# Patient Record
Sex: MALE | Race: BLACK OR AFRICAN AMERICAN | URBAN - METROPOLITAN AREA
[De-identification: names, ages, dates, MRNs, and addresses within clinical notes are randomized per-mention and may not be internally consistent; named-entity substitution may affect disease eponyms.]

---

## 2021-12-16 ENCOUNTER — TELEPHONE (OUTPATIENT)
Dept: PULMONOLOGY | Age: 46
End: 2021-12-16

## 2021-12-16 NOTE — TELEPHONE ENCOUNTER
Patient left a voicemail to schedule appointment. Last seen 2015. Called patient back and left message to schedule appointment.   [phone]

## 2022-02-17 ENCOUNTER — TELEMEDICINE (OUTPATIENT)
Dept: PULMONOLOGY | Age: 47
End: 2022-02-17
Payer: COMMERCIAL

## 2022-02-17 ENCOUNTER — TELEPHONE (OUTPATIENT)
Dept: PULMONOLOGY | Age: 47
End: 2022-02-17

## 2022-02-17 DIAGNOSIS — G47.33 OBSTRUCTIVE SLEEP APNEA: Primary | ICD-10-CM

## 2022-02-17 DIAGNOSIS — E66.9 NON MORBID OBESITY, UNSPECIFIED OBESITY TYPE: Chronic | ICD-10-CM

## 2022-02-17 PROCEDURE — 99203 OFFICE O/P NEW LOW 30 MIN: CPT | Performed by: INTERNAL MEDICINE

## 2022-02-17 ASSESSMENT — SLEEP AND FATIGUE QUESTIONNAIRES
HOW LIKELY ARE YOU TO NOD OFF OR FALL ASLEEP WHEN YOU ARE A PASSENGER IN A CAR FOR AN HOUR WITHOUT A BREAK: 0
HOW LIKELY ARE YOU TO NOD OFF OR FALL ASLEEP WHILE SITTING INACTIVE IN A PUBLIC PLACE: 0
HOW LIKELY ARE YOU TO NOD OFF OR FALL ASLEEP WHILE LYING DOWN TO REST IN THE AFTERNOON WHEN CIRCUMSTANCES PERMIT: 1
HOW LIKELY ARE YOU TO NOD OFF OR FALL ASLEEP IN A CAR, WHILE STOPPED FOR A FEW MINUTES IN TRAFFIC: 0
HOW LIKELY ARE YOU TO NOD OFF OR FALL ASLEEP WHILE SITTING QUIETLY AFTER LUNCH WITHOUT ALCOHOL: 1
HOW LIKELY ARE YOU TO NOD OFF OR FALL ASLEEP WHILE WATCHING TV: 0
HOW LIKELY ARE YOU TO NOD OFF OR FALL ASLEEP WHILE SITTING AND READING: 0
HOW LIKELY ARE YOU TO NOD OFF OR FALL ASLEEP WHILE SITTING AND TALKING TO SOMEONE: 0
ESS TOTAL SCORE: 2

## 2022-02-17 NOTE — PROGRESS NOTES
Nehemias Burden MD, [facility], [facility]  Javid Segura CNP [address]  [address] [facility], [address] [phone]   [facility] [address] Roni Anne. [address] [facility] [address] [phone]     Video Visit- [address] [address], was evaluated through a synchronous (real-time) audio-video  encounter. The patient (or guardian if applicable) is aware that this is a billable  service, which includes applicable co-pays. This Virtual Visit was conducted with  patient's (and/or legal guardian's) consent. The visit was conducted pursuant to  the emergency declaration under the [facility], [address] [place] [address] [facility] authority and the [employer] and  [employer] General Act. Patient identification was verified,  and a caregiver was present when appropriate. The patient was located in a  state where the provider was licensed to provide care. Assessment:      Visit Diagnoses and Associated Orders     Obstructive sleep apnea   (New Problem)  -  Primary    old records reviewed         Non morbid obesity, unspecified obesity type   (Not Stable)                  Plan:      Reviewed compliance download with pt. Supplies and parts as needed for his machine. These are medically necessary. Continue medications per his PCP and other physicians. Limit caffeine use after 3pm.  Encouraged him to work on weight loss through diet and exercise. The primary encounter diagnosis was Obstructive sleep apnea. A diagnosis of Non morbid obesity, unspecified obesity type was also pertinent to this visit. The chronic medical conditions listed are directly related to the primary diagnosis listed above. The management of the primary diagnosis affects the secondary diagnosis and vice versa. Machine is over 5years old and is medically necessary for it to be replaced.     Discussed with patient today the recent recall issued by [employer] for their Casual[employer] platform Pap machines. Reviewed that it affected a very small number of machines (0.03%) and seems to be exacerbated by using ozone disinfection or machine being exposed to a very high heat/humidity environment. Recommended the patient immediately discontinue use of any external ozone  if being used. Discussed the risk of untreated sleep apnea (including but not limited to early morbidity mortality, motor vehicle accidents, and cardiovascular issues, etc.) versus the very small risk of foam degradation with use of the machine. Possible alternative may be to switch manufacturers for their machine but will need to see if their insurance company will allow that. Reviewed external PSG done on 10/11/2013 that showed severe sleep apnea with an AHI of 31/HI and a low sat of 91%. Reviewed external titration done on 11/8/2013 showing the patient was titrated pressure 7. This information was analyzed to assess complexity and medical decision making in regards to further testing and management. Pt would medically benefit from wt loss for KATHY (diet, exercise, surgical). Subjective:     Patient ID: Chester Carvalho is a 50 y.o. male. Chief Complaint   Patient presents with    Sleep Apnea       HPI:      Chester Carvalho is a 50 y.o. male self-referred for a sleep evaluation. He complains of: Diagnosis severe sleep apnea back in 2013. Was placed on a machine and did well for some time. He was lost to follow-up back in 2015. Since then he has gained some weight and is not sleeping as well as machine is making loud noises at nighttime. His machine is currently 5years old. DOT/CDL - No  FAA/'s license -No    Previous Report(s) Reviewed: historical medical records, office notes, and referral letter(s). Pertinent data has been documented.     [place] - Total score: 2    Social History     Socioeconomic History    Marital status:      Spouse name: Not on file    Number of children: Not on file    Years of education: Not on file    Highest education level: Not on file   Occupational History    Not on file   Tobacco Use    Smoking status: Never Smoker    Smokeless tobacco: Never Used   Substance and Sexual Activity    Alcohol use: No     Alcohol/week: 0.0 standard drinks    Drug use: No    Sexual activity: Not on file   Other Topics Concern    Not on file   Social History Narrative    Not on file     Social Determinants of Health     Financial Resource Strain:     Difficulty of Paying Living Expenses: Not on file   Food Insecurity:     Worried About Running Out of Food in the Last Year: Not on file    Shala of Food in the Last Year: Not on file   Transportation Needs:     Lack of Transportation (Medical): Not on file    Lack of Transportation (Non-Medical): Not on file   Physical Activity:     Days of Exercise per Week: Not on file    Minutes of Exercise per Session: Not on file   Stress:     Feeling of Stress : Not on file   Social Connections:     Frequency of Communication with Friends and Family: Not on file    Frequency of Social Gatherings with Friends and Family: Not on file    Attends Anabaptism Services: Not on file    Active Member of [address] [place] [address] or Organizations: Not on file    Attends Club or Organization Meetings: Not on file    Marital Status: Not on file   Intimate Partner Violence:     Fear of Current or Ex-Partner: Not on file    Emotionally Abused: Not on file    Physically Abused: Not on file    Sexually Abused: Not on file   Housing Stability:     Unable to Pay for Housing in the Last Year: Not on file    Number of [place] in the Last Year: Not on file    Unstable Housing in the Last Year: Not on file        Current Outpatient Medications   Medication Sig Dispense Refill    rosuvastatin (CRESTOR) 10 MG TABS Take by mouth      Multiple Vitamin (MULTI VITAMIN DAILY PO) Take by mouth       No current facility-administered medications for this visit. Allergies as of 02/17/2022    (No Known Allergies)       Patient Active Problem List   Diagnosis    Obstructive sleep apnea    Hypercholesteremia    Non morbid obesity, unspecified obesity type       Past Medical History:   Diagnosis Date    Hypercholesteremia     Non morbid obesity, unspecified obesity type 2/17/2022    Obstructive sleep apnea     Obstructive sleep apnea (adult) (pediatric)        Family History   Problem Relation Age of Onset    Hypertension Father        Objective:     Vitals:  Patient reported Height and Weight Calculated BMI   Patient-Reported Vitals 2/17/2022   Patient-Reported Weight 205 lb   Patient-Reported Height 5.6'       33.1     Due to COVID-19 this was a virtual visit and physical exam was deferred.     Electronically signed by Abbe Partida MD on2/17/2022 at 10:36 AM

## 2022-02-17 NOTE — LETTER
[facility]  [address] [address]  Phone: [phone]  Fax: [phone]    Joel Espinoza MD    February 17, 2022     Patricio Cutler MD  [address]  [facility] [address]    Patient: Rahat Renee   MR Number: <[id number]>   YOB: 1975   Date of Visit: 2/17/2022       Dear Patricio Cutler:    Thank you for referring Cathy Quezada to me for evaluation/treatment. Below are the relevant portions of my assessment and plan of care. Visit Diagnoses and Associated Orders     Obstructive sleep apnea   (New Problem)  -  Primary    old records reviewed         Non morbid obesity, unspecified obesity type   (Not Stable)                 Reviewed compliance download with pt. Supplies and parts as needed for his machine. These are medically necessary. Continue medications per his PCP and other physicians. Limit caffeine use after 3pm.  Encouraged him to work on weight loss through diet and exercise. The primary encounter diagnosis was Obstructive sleep apnea. A diagnosis of Non morbid obesity, unspecified obesity type was also pertinent to this visit. The chronic medical conditions listed are directly related to the primary diagnosis listed above. The management of the primary diagnosis affects the secondary diagnosis and vice versa. Machine is over 5years old and is medically necessary for it to be replaced. Discussed with patient today the recent recall issued by [employer] for their [employer] platform Pap machines. Reviewed that it affected a very small number of machines (0.03%) and seems to be exacerbated by using ozone disinfection or machine being exposed to a very high heat/humidity environment. Recommended the patient immediately discontinue use of any external ozone  if being used.   Discussed the risk of untreated sleep apnea (including but not limited to early morbidity mortality, motor vehicle accidents, and cardiovascular issues, etc.) versus the very small risk of foam degradation with use of the machine. Possible alternative may be to switch manufacturers for their machine but will need to see if their insurance company will allow that. Reviewed external PSG done on 10/11/2013 that showed severe sleep apnea with an AHI of 31/HI and a low sat of 91%. Reviewed external titration done on 11/8/2013 showing the patient was titrated pressure 7. This information was analyzed to assess complexity and medical decision making in regards to further testing and management. Pt would medically benefit from wt loss for KATHY (diet, exercise, surgical). If you have questions, please do not hesitate to call me. I look forward to following Rhonda Roldan along with you.     Sincerely,      Maru Harry MD

## 2022-02-18 ENCOUNTER — TELEPHONE (OUTPATIENT)
Dept: PULMONOLOGY | Age: 47
End: 2022-02-18

## 2022-02-18 NOTE — PROGRESS NOTES
Caryle Jury         : 1975   [address]                                                                     Please expedite AHI 31                                                                    Unit is 5 yrs old and noisy      Diagnosis: [x] KATHY (G47.33) [] CSA (G47.31) [] Apnea (G47.30)   Length of Need: [x] 12 Months [] 99 Months [] Other:    Machine (JUAN ANTONIO!): [] Respironics Dream Station   2   Auto [x] ResMed AirSense     Auto S11 [] Other:     [x]  CPAP () [] Bilevel ()   Mode: [x] Auto [] Spontaneous    Mode: [] Auto [] Spontaneous      P min 11 cmH2O  P max 20 cmH2O      Comfort Settings:   - Ramp Pressure: 5 cmH2O                                        - Ramp time: 15 min                                     -  Flex/EPR - 3 full time                                    - For ResMed Bilevel (TiMax-4 sec   TiMin- 0.2 sec)     Humidifier: [x] Heated ()        [x] Water chamber replacement ()/ 1 per 6 months        Mask:   [x] Nasal () /1 per 3 months [] Full Face () /1 per 3 months   [x] Patient choice -Size and fit mask [] Patient Choice - Size and fit mask   [] Dispense:  [] Dispense:    [x] Headgear () / 1 per 3 months [] Headgear () / 1 per 3 months   [x] Replacement Nasal Cushion ()/2 per month [] Interface Replacement ()/1 per month   [] Replacement Nasal Pillows ()/2 per month         Tubing: [x] Heated ()/1 per 3 months    [] Standard ()/1 per 3 months [] Other:           Filters: [x] Non-disposable ()/1 per 6 months     [x] Ultra-Fine, Disposable ()/2 per month        Miscellaneous: [] Chin Strap ()/ 1 per 6 months [] O2 bleed-in:       LPM   [] Oximetry on CPAP/Bilevel []  Other:    [x] Modem: ()         Start Order Date: 22    MEDICAL JUSTIFICATION:  I, the undersigned, certify that the above prescribed supplies are medically necessary for this patients wellbeing.   In my opinion, the supplies are both reasonable and necessary in reference to accepted standards of medicalpractice in treatment of this patients condition. Bradly Rodriguez MD      NPI: [id number]       Order Signed Date: 22    Electronically signed by Bradly Rodriguez MD on 2022 at 2:33 PM    Dakota Barahona  1975  [address] [facility] [address]  [phone] (home)   [phone] (mobile)      Insurance Info (confirm with patient if correct):  Payor/Plan Subscr  Sex Relation Sub.  Ins. ID Effective Group Num

## 2022-05-18 ENCOUNTER — TELEPHONE (OUTPATIENT)
Dept: PULMONOLOGY | Age: 47
End: 2022-05-18

## 2022-08-30 ENCOUNTER — TELEMEDICINE (OUTPATIENT)
Dept: PULMONOLOGY | Age: 47
End: 2022-08-30
Payer: COMMERCIAL

## 2022-08-30 DIAGNOSIS — E66.9 NON MORBID OBESITY, UNSPECIFIED OBESITY TYPE: ICD-10-CM

## 2022-08-30 DIAGNOSIS — I10 PRIMARY HYPERTENSION: ICD-10-CM

## 2022-08-30 DIAGNOSIS — G47.33 OBSTRUCTIVE SLEEP APNEA: Primary | Chronic | ICD-10-CM

## 2022-08-30 PROCEDURE — 99214 OFFICE O/P EST MOD 30 MIN: CPT | Performed by: NURSE PRACTITIONER

## 2022-08-30 RX ORDER — LISINOPRIL 10 MG/1
10 TABLET ORAL DAILY
COMMUNITY

## 2022-08-30 ASSESSMENT — SLEEP AND FATIGUE QUESTIONNAIRES
HOW LIKELY ARE YOU TO NOD OFF OR FALL ASLEEP WHILE WATCHING TV: 0
HOW LIKELY ARE YOU TO NOD OFF OR FALL ASLEEP WHEN YOU ARE A PASSENGER IN A CAR FOR AN HOUR WITHOUT A BREAK: 0
HOW LIKELY ARE YOU TO NOD OFF OR FALL ASLEEP WHILE SITTING AND READING: 0
ESS TOTAL SCORE: 2
HOW LIKELY ARE YOU TO NOD OFF OR FALL ASLEEP WHILE SITTING QUIETLY AFTER LUNCH WITHOUT ALCOHOL: 0
HOW LIKELY ARE YOU TO NOD OFF OR FALL ASLEEP IN A CAR, WHILE STOPPED FOR A FEW MINUTES IN TRAFFIC: 0
HOW LIKELY ARE YOU TO NOD OFF OR FALL ASLEEP WHILE SITTING INACTIVE IN A PUBLIC PLACE: 0
HOW LIKELY ARE YOU TO NOD OFF OR FALL ASLEEP WHILE LYING DOWN TO REST IN THE AFTERNOON WHEN CIRCUMSTANCES PERMIT: 2
HOW LIKELY ARE YOU TO NOD OFF OR FALL ASLEEP WHILE SITTING AND TALKING TO SOMEONE: 0

## 2022-08-30 NOTE — ASSESSMENT & PLAN NOTE
Chronic-Improving: Reviewed and analyzed results of physiologic download from patient's machine and reviewed with patient. Supplies and parts as needed for his machine. These are medically necessary. Limit caffeine use after 3pm. Based on the analyzed data will change following settings: P min increased to 12. Pressure changes sent via machine modem. Will trial pressure increase to see if this will help with nocturia. Encouraged him to work with his DME on mask fit and comfort. Provided resources to view different styles of masks. Will see him back in 3 months. Encouraged him to contact the office with any questions or concerns.

## 2022-08-30 NOTE — PROGRESS NOTES
Diagnosis: [x] KATHY (G47.33) [] CSA (G47.31) [] Apnea (G47.30)   Length of Need: [x] 15 Months [] 99 Months [] Other:   Machine (JUAN ANTONIO!): [] Respironics Dream Station      Auto [] ResMed AirSense     Auto [] Other:     []  CPAP () [] Bilevel ()   Mode: [] Auto [] Spontaneous    Mode: [] Auto [] Spontaneous            Comfort Settings:      Humidifier: [] Heated ()        [x] Water chamber replacement ()/ 1 per 6 months        Mask:   [x] Nasal () /1 per 3 months [] Full Face () /1 per 3 months   [x] Patient choice -Size and fit mask [] Patient Choice - Size and fit mask   [] Dispense: [] Dispense:   [x] Headgear () / 1 per 3 months [] Headgear () / 1 per 3 months   [x] Replacement Nasal Cushion ()/2 per month [] Interface Replacement ()/1 per month   [x] Replacement Nasal Pillows ()/2 per month         Tubing: [x] Heated ()/1 per 3 months    [] Standard ()/1 per 3 months [] Other:           Filters: [x] Non-disposable ()/1 per 6 months     [x] Ultra-Fine, Disposable ()/2 per month        Miscellaneous: [] Chin Strap ()/ 1 per 6 months [] O2 bleed-in:        LPM   [] Oxymetry on CPAP/Bilevel []  Other:         Start Order Date: 08/30/22    MEDICAL JUSTIFICATION:  I, the undersigned, certify that the above prescribed supplies are medically necessary for this patients wellbeing. In my opinion, the supplies are both reasonable and necessary in reference to accepted standards of medicalpractice in treatment of this patients condition. Josh Key NP    NPI: [id number]       Order Signed Date: 08/30/22  [facility]  Pulmonary, Sleep, and Critical Care    Pulmonary, Sleep, and Critical Care  [address] [place] [address].  [facility], [facility] , [facility]                                    Hema Castillo  Phone: [phone]    Fax: [address]  1975  [address] [place] [address]  [phone] (home)   [phone] (mobile)      Insurance Info (confirm with patient if correct):  Payer/Plan Subscr  Sex Relation Sub.  Ins. ID Effective Group Num

## 2022-08-30 NOTE — PROGRESS NOTES
Marcy Sun [facility]  [address] [facility], [address]- [phone]   [facility] [address] Roni Anne. [address] [place] [address]. [address] [phone]     Video Visit- Follow up    Jackie Le, was evaluated through a synchronous (real-time) audio-video  encounter. The patient (or guardian if applicable) is aware that this is a billable  service, which includes applicable co-pays. This Virtual Visit was conducted with  patient's (and/or legal guardian's) consent. The visit was conducted pursuant to  the emergency declaration under the [address] [place] [address] authority and the [employer] and  [employer] General Act. Patient identification was verified,  and a caregiver was present when appropriate. The patient was located in a  state where the provider was licensed to provide care. Assessment/Plan:      1. Obstructive sleep apnea  Assessment & Plan:   Chronic-Improving: Reviewed and analyzed results of physiologic download from patient's machine and reviewed with patient. Supplies and parts as needed for his machine. These are medically necessary. Limit caffeine use after 3pm. Based on the analyzed data will change following settings: P min increased to 12. Pressure changes sent via machine modem. Will trial pressure increase to see if this will help with nocturia. Encouraged him to work with his DME on mask fit and comfort. Provided resources to view different styles of masks. Will see him back in 3 months. Encouraged him to contact the office with any questions or concerns. 2. Primary hypertension  Assessment & Plan:   Chronic- Stable. Discussed the importance of treating obstructive sleep apnea as part of the management of this disorder. Cont any meds per PCP and other physicians.     3. Non morbid obesity, unspecified obesity type  Assessment & Plan:  Chronic-not stable:  Discussed importance of treating obstructive sleep apnea and getting sufficient sleep to assist with weight control. Encouraged him to work on weight loss through diet and exercise. Recommended DASH or Mediterranean diets. Reviewed, analyzed, and documented physiologic data from patient's PAP machine. This information was analyzed to assess complexity and medical decision making in regards to further testing and management. The primary encounter diagnosis was Obstructive sleep apnea. Diagnoses of Primary hypertension and Non morbid obesity, unspecified obesity type were also pertinent to this visit. The chronic medical conditions listed are directly related to the primary diagnosis listed above. The management of the primary diagnosis affects the secondary diagnosis and vice versa. Subjective:     Patient ID: Tory Gambino is a 50 y.o. male. Chief Complaint   Patient presents with    Sleep Apnea     Subjective   HPI:    Machine Modem/Download Info:  Compliance (hours/night): 6.8 hrs/night  % of nights >= 4 hrs: 98 %  Download AHI (/hour): 0.4 /HR  Average CPAP Pressure : 12.5 cmH2O      APAP - Settings  Pressure Min: 11 cmH2O  Pressure Max: 20 cmH2O                 Comfort Settings  Flex/EPR (0-3): 3 PAP Mask  Mask Type: Nasal pillows     Tory Gambino reports he is doing well with his new machine. The pressure on his machine is comfortable and he is waking rested for the most part. Over the last month he has been waking more often for the bathroom during the night which is interrupting his sleep. He is currently undergoing evaluation with his PCP. he denies headaches, congestion, nosebleeds, dryness, aerophagia, or drowsiness while driving. He may like to try a different mask.     [address]    [place] Sleepiness Score: 2    Current Outpatient Medications   Medication Instructions    lisinopril (PRINIVIL;ZESTRIL) 10 mg, Oral, DAILY    Multiple Vitamin (MULTI VITAMIN DAILY PO) Oral    rosuvastatin (CRESTOR) 10 MG TABS Oral        Electronically signed by SAEID Brown on 8/30/2022 at 3:05 PM

## 2022-12-22 ENCOUNTER — TELEMEDICINE (OUTPATIENT)
Dept: PULMONOLOGY | Age: 47
End: 2022-12-22
Payer: COMMERCIAL

## 2022-12-22 DIAGNOSIS — E66.9 NON MORBID OBESITY, UNSPECIFIED OBESITY TYPE: Chronic | ICD-10-CM

## 2022-12-22 DIAGNOSIS — G47.33 OBSTRUCTIVE SLEEP APNEA: Chronic | ICD-10-CM

## 2022-12-22 DIAGNOSIS — I10 PRIMARY HYPERTENSION: Chronic | ICD-10-CM

## 2022-12-22 PROCEDURE — 99214 OFFICE O/P EST MOD 30 MIN: CPT | Performed by: INTERNAL MEDICINE

## 2022-12-22 ASSESSMENT — SLEEP AND FATIGUE QUESTIONNAIRES
HOW LIKELY ARE YOU TO NOD OFF OR FALL ASLEEP WHILE SITTING AND TALKING TO SOMEONE: 0
HOW LIKELY ARE YOU TO NOD OFF OR FALL ASLEEP WHILE WATCHING TV: 1
HOW LIKELY ARE YOU TO NOD OFF OR FALL ASLEEP WHILE LYING DOWN TO REST IN THE AFTERNOON WHEN CIRCUMSTANCES PERMIT: 2
HOW LIKELY ARE YOU TO NOD OFF OR FALL ASLEEP WHEN YOU ARE A PASSENGER IN A CAR FOR AN HOUR WITHOUT A BREAK: 0
HOW LIKELY ARE YOU TO NOD OFF OR FALL ASLEEP WHILE SITTING INACTIVE IN A PUBLIC PLACE: 0
HOW LIKELY ARE YOU TO NOD OFF OR FALL ASLEEP IN A CAR, WHILE STOPPED FOR A FEW MINUTES IN TRAFFIC: 0
HOW LIKELY ARE YOU TO NOD OFF OR FALL ASLEEP WHILE SITTING QUIETLY AFTER LUNCH WITHOUT ALCOHOL: 1
ESS TOTAL SCORE: 4
HOW LIKELY ARE YOU TO NOD OFF OR FALL ASLEEP WHILE SITTING AND READING: 0

## 2022-12-22 NOTE — LETTER
[facility] Sleep [facility]  [place] [address]  Phone: [phone]  Fax: [phone]    Sherry Seaman MD    December 22, 2022     Torin Vera MD  [facility] [address]    Patient: William Garza   MR Number: [id number]   YOB: 1975   Date of Visit: 12/22/2022       Dear Torin Vera:    Thank you for referring Sameera Nunez to me for evaluation/treatment. Below are the relevant portions of my assessment and plan of care. 1. Obstructive sleep apnea  Assessment & Plan:  Chronic-Stable: Reviewed and analyzed results of physiologic download from patient's machine and reviewed with patient. Supplies and parts as needed for his machine. These are medically necessary. Limit caffeine use after 3pm. Based on the analyzed data will continue with current settings   2. Primary hypertension  Assessment & Plan:  Chronic- Stable. Discussed the importance of treating sleep apnea as part of the management of this disorder. Cont any meds per PCP and other physicians. 3. Non morbid obesity, unspecified obesity type  Assessment & Plan:  Chronic-not stable:  Discussed importance of treating obstructive sleep apnea and getting sufficient sleep to assist with weight control. Encouraged him to work on weight loss through diet and exercise. Recommended DASH or Mediterranean diets. Reviewed, analyzed, and documented physiologic data from patient's PAP machine. This information was analyzed to assess complexity and medical decision making in regards to further testing and management. Diagnoses of Obstructive sleep apnea, Primary hypertension, and Non morbid obesity, unspecified obesity type were pertinent to this visit. The chronic medical conditions listed are directly related to the primary diagnosis listed above. The management of the primary diagnosis affects the secondary diagnosis and vice versa.          If you have questions, please do not hesitate to call me. I look forward to following Froy Crews along with you.     Sincerely,      Janak Montiel MD

## 2022-12-22 NOTE — PROGRESS NOTES
Maddi Serra MD  [address], [address] [facility]- [phone]   [facility] Roni Anne. [address] [place] [address]. Shadi Almonte 37 [phone]     Video Visit- Follow up    Belinda Martínez, was evaluated through a synchronous (real-time) audio-video  encounter. The patient (or guardian if applicable) is aware that this is a billable  service, which includes applicable co-pays. This Virtual Visit was conducted with  patient's (and/or legal guardian's) consent. The visit was conducted pursuant to  the emergency declaration under the [facility], [address] [place] [address] waiver authority and the [employer] and  [employer] General Act. Patient identification was verified,  and a caregiver was present when appropriate. The patient was located in a  state where the provider was licensed to provide care. Assessment/Plan:      1. Obstructive sleep apnea  Assessment & Plan:  Chronic-Stable: Reviewed and analyzed results of physiologic download from patient's machine and reviewed with patient. Supplies and parts as needed for his machine. These are medically necessary. Limit caffeine use after 3pm. Based on the analyzed data will continue with current settings   2. Primary hypertension  Assessment & Plan:  Chronic- Stable. Discussed the importance of treating sleep apnea as part of the management of this disorder. Cont any meds per PCP and other physicians. 3. Non morbid obesity, unspecified obesity type  Assessment & Plan:  Chronic-not stable:  Discussed importance of treating obstructive sleep apnea and getting sufficient sleep to assist with weight control. Encouraged him to work on weight loss through diet and exercise. Recommended DASH or Mediterranean diets. Reviewed, analyzed, and documented physiologic data from patient's PAP machine.     This information was analyzed to assess complexity and medical decision making in regards to further testing and management. Diagnoses of Obstructive sleep apnea, Primary hypertension, and Non morbid obesity, unspecified obesity type were pertinent to this visit. The chronic medical conditions listed are directly related to the primary diagnosis listed above. The management of the primary diagnosis affects the secondary diagnosis and vice versa. Subjective:     Patient ID: Vashti Lopez is a 50 y.o. male. Chief Complaint   Patient presents with    Sleep Apnea     Subjective   HPI:    Machine Modem/Download Info:  Compliance (hours/night): 6.42 hrs/night  % of nights >= 4 hrs: 100 %  Download AHI (/hour): 0.4 /HR  Average CPAP Pressure : 13.2 cmH2O APAP - Settings  Pressure Min: 12 cmH2O  Pressure Max: 20 cmH2O                 Comfort Settings  Flex/EPR (0-3): 3       He continues to do well with his machine at the current settings. No issues with EDS, snoring, or apneas. He is waking refreshed, for the most part, in the am.  No HA, dryness, or congestion. No issues using his machine. He does toss and turn at night but has a 21year-old mattress is getting ready to move to a new house and is getting a new mattress at that time.     [address]    Conyers - Conyers Sleepiness Score: 4    Current Outpatient Medications   Medication Instructions    lisinopril (PRINIVIL;ZESTRIL) 10 mg, Oral, DAILY    Multiple Vitamin (MULTI VITAMIN DAILY PO) Oral    rosuvastatin (CRESTOR) 10 MG TABS Oral        Electronically signed by Vallerie Bumpers, MD on 12/22/2022 at 2:52 PM

## 2025-01-15 ENCOUNTER — OFFICE VISIT (OUTPATIENT)
Dept: PULMONOLOGY | Age: 50
End: 2025-01-15

## 2025-01-15 VITALS
HEART RATE: 62 BPM | HEIGHT: 66 IN | SYSTOLIC BLOOD PRESSURE: 114 MMHG | DIASTOLIC BLOOD PRESSURE: 72 MMHG | WEIGHT: 217.2 LBS | BODY MASS INDEX: 34.91 KG/M2 | OXYGEN SATURATION: 99 %

## 2025-01-15 DIAGNOSIS — I10 PRIMARY HYPERTENSION: Chronic | ICD-10-CM

## 2025-01-15 DIAGNOSIS — G47.33 OBSTRUCTIVE SLEEP APNEA: Primary | Chronic | ICD-10-CM

## 2025-01-15 DIAGNOSIS — E66.9 NON MORBID OBESITY, UNSPECIFIED OBESITY TYPE: Chronic | ICD-10-CM

## 2025-01-15 ASSESSMENT — SLEEP AND FATIGUE QUESTIONNAIRES
HOW LIKELY ARE YOU TO NOD OFF OR FALL ASLEEP WHILE SITTING AND TALKING TO SOMEONE: WOULD NEVER DOZE
HOW LIKELY ARE YOU TO NOD OFF OR FALL ASLEEP IN A CAR, WHILE STOPPED FOR A FEW MINUTES IN TRAFFIC: WOULD NEVER DOZE
HOW LIKELY ARE YOU TO NOD OFF OR FALL ASLEEP WHILE WATCHING TV: WOULD NEVER DOZE
HOW LIKELY ARE YOU TO NOD OFF OR FALL ASLEEP WHEN YOU ARE A PASSENGER IN A CAR FOR AN HOUR WITHOUT A BREAK: WOULD NEVER DOZE
HOW LIKELY ARE YOU TO NOD OFF OR FALL ASLEEP WHILE SITTING AND READING: WOULD NEVER DOZE
HOW LIKELY ARE YOU TO NOD OFF OR FALL ASLEEP WHILE SITTING INACTIVE IN A PUBLIC PLACE: WOULD NEVER DOZE
HOW LIKELY ARE YOU TO NOD OFF OR FALL ASLEEP WHILE LYING DOWN TO REST IN THE AFTERNOON WHEN CIRCUMSTANCES PERMIT: WOULD NEVER DOZE
HOW LIKELY ARE YOU TO NOD OFF OR FALL ASLEEP WHILE SITTING QUIETLY AFTER LUNCH WITHOUT ALCOHOL: WOULD NEVER DOZE
ESS TOTAL SCORE: 0

## 2025-01-15 NOTE — PROGRESS NOTES
[facility]  [address]  [address]  [place] [address]  P- [phone]  F- [phone]   [facility] PHYS[facility]ANS [place] [facility] SLEEP MEDICINE  [address]  [address]  [facility] [address]  Dept: [phone]  Dept Fax: [phone]  Loc: [phone]      Assessment/Plan:      1. Obstructive sleep apnea  Assessment & Plan:  Chronic-Stable: Reviewed and analyzed results of physiologic download from patient's machine and reviewed with patient.  Supplies and parts as needed for his machine.  These are medically necessary.  Limit caffeine use after 3pm. Based on the analyzed data will change following settings: P min decreased to 10. Changes sent via machine modem.  Stable on his machine, getting benefit from the use, and having minimal side effects.  Will decrease pressure for aerophagia.  Will pull machine data report in 1 month to review.  Will see him back in 1 year.  Encouraged him to contact the office any questions or concerns.    2. Primary hypertension  Assessment & Plan:  Chronic- Stable.  Discussed the importance of treating obstructive sleep apnea as part of the management of this disorder.  Cont any meds per PCP and other physicians.    3. Non morbid obesity, unspecified obesity type  Assessment & Plan:  Chronic-not stable:  Discussed importance of treating obstructive sleep apnea and getting sufficient sleep to assist with weight control.  Discussed weight gain and/or weight loss may require adjustments to machine settings. Encouraged him to work on weight loss through diet and exercise.         Reviewed, analyzed, and documented physiologic data from patient's PAP machine.    This information was analyzed to assess complexity and medical decision making in regards to further testing and management.    The primary encounter diagnosis was Obstructive sleep apnea. Diagnoses of Primary hypertension and Non morbid

## 2025-01-15 NOTE — ASSESSMENT & PLAN NOTE
Chronic-Stable: Reviewed and analyzed results of physiologic download from patient's machine and reviewed with patient.  Supplies and parts as needed for his machine.  These are medically necessary.  Limit caffeine use after 3pm. Based on the analyzed data will change following settings: P min decreased to 10. Changes sent via machine modem.  Stable on his machine, getting benefit from the use, and having minimal side effects.  Will decrease pressure for aerophagia.  Will pull machine data report in 1 month to review.  Will see him back in 1 year.  Encouraged him to contact the office any questions or concerns.

## 2025-01-15 NOTE — PROGRESS NOTES
Diagnosis: [x] KATHY (G47.33) [] CSA (G47.31) [] Apnea (G47.30)   Length of Need: [x] 15 Months [] 99 Months [] Other:   Machine (JUAN ANTONIO!): [] Respironics Dream Station      Auto [] ResMed AirSense     Auto [] Other:     []  CPAP () [] Bilevel ()   Mode: [] Auto [] Spontaneous    Mode: [] Auto [] Spontaneous              Comfort Settings:      Humidifier: [] Heated ()        [x] Water chamber replacement ()/ 1 per 6 months        Mask:   [x] Nasal () /1 per 3 months [] Full Face () /1 per 3 months   [x] Patient choice -Size and fit mask [] Patient Choice - Size and fit mask   [] Dispense: [] Dispense:   [x] Headgear () / 1 per 3 months [] Headgear () / 1 per 3 months   [] Replacement Nasal Cushion ()/2 per month [] Interface Replacement ()/1 per month   [x] Replacement Nasal Pillows ()/2 per month         Tubing: [x] Heated ()/1 per 3 months    [] Standard ()/1 per 3 months [] Other:           Filters: [x] Non-disposable ()/1 per 6 months     [x] Ultra-Fine, Disposable ()/2 per month        Miscellaneous: [] Chin Strap ()/ 1 per 6 months [] O2 bleed-in:        LPM   [] Oxymetry on CPAP/Bilevel []  Other:         Start Order Date: 01/15/25    MEDICAL JUSTIFICATION:  I, the undersigned, certify that the above prescribed supplies are medically necessary for this patient’s wellbeing.  In my opinion, the supplies are both reasonable and necessary in reference to accepted standards of medicalpractice in treatment of this patient’s condition.    MELINDA CHATTERJEE NP    NPI: [id number]       Order Signed Date: 01/15/25  [facility]  Pulmonary, Sleep, and Critical Care    Pulmonary, Sleep, and Critical Care  [facility] [address]                          [address] [place] [address] [address]  [place] [address]                                    [place] [address]  Phone: [phone]    Fax:

## 2025-01-15 NOTE — ASSESSMENT & PLAN NOTE
Chronic- Stable.  Discussed the importance of treating obstructive sleep apnea as part of the management of this disorder.  Cont any meds per PCP and other physicians.     no

## 2025-02-12 ENCOUNTER — TELEPHONE (OUTPATIENT)
Dept: PULMONOLOGY | Age: 50
End: 2025-02-12

## 2025-02-12 NOTE — TELEPHONE ENCOUNTER
Machine data report obtained and reviewed to assess pressure adjustments to his machine during his visit on 1/15/25. Compliance is good, 100%. Obstructive sleep apnea appears controlled with an AHI of 0.6/hr.